# Patient Record
Sex: FEMALE | Race: BLACK OR AFRICAN AMERICAN
[De-identification: names, ages, dates, MRNs, and addresses within clinical notes are randomized per-mention and may not be internally consistent; named-entity substitution may affect disease eponyms.]

---

## 2018-06-01 ENCOUNTER — HOSPITAL ENCOUNTER (EMERGENCY)
Dept: HOSPITAL 68 - ERH | Age: 43
End: 2018-06-01
Payer: COMMERCIAL

## 2018-06-01 VITALS — DIASTOLIC BLOOD PRESSURE: 84 MMHG | SYSTOLIC BLOOD PRESSURE: 122 MMHG

## 2018-06-01 DIAGNOSIS — K62.89: Primary | ICD-10-CM

## 2018-06-01 NOTE — ED GI/GU/ABDOMINAL COMPLAINT
History of Present Illness
 
General
Chief Complaint: Abdominal Pain/Flank Pain
Stated Complaint: ABD PAIN, X 2 WEEKS
Source: patient
Exam Limitations: no limitations
 
Vital Signs & Intake/Output
Vital Signs & Intake/Output
 Vital Signs
 
 
Date Time Temp Pulse Resp B/P B/P Pulse O2 O2 Flow FiO2
 
     Mean Ox Delivery Rate 
 
 1345 98.0 99 18 122/84  99 Room Air  
 
 
 
Allergies
Coded Allergies:
No Known Allergies (17)
 
Reconcile Medications
Ibuprofen 800 MG TABLET   1 TAB PO TID PRN pain
 
Triage Nurses Notes Reviewed? yes
Pregnant? N
Is pt currently breastfeeding? No
Onset: Abrupt
Duration: day(s):, constant
Timing: recent history
HPI:
42-year-old female comes into the emergency room with complaints of mucousy 
discharge in rectal region as well as rectal pain.  The patient was seen in 
triage.  A full evaluation including physical exam and more detailed history 
cannot be performed due to the fact that she was seen in triage.  The patient 
came in with the expectation of a colonoscopy.  Patient reports that she had 
endometrial cancer back in January and had a hysterectomy.  She decided to opt 
out of radiation and chemotherapy.  She had read that coffee enemas could be 
helpful and started to do coffee enemas at home.  She reports that she was also 
juicing.  She was having good bowel movements but now she has been constipated 
and has not been able to have good bowel movements recently.  She has mucousy 
discharge.  Denies any fever chills vomiting.  She had prior  and the 
hysterectomy but denies any other abdominal surgeries.  She reports that she 
wants a colonoscopy done.
(Tariq Quiroz)
 
Past History
 
Travel History
Traveled to [place] past 21 day No
 
Medical History
Any Pertinent Medical History? see below for history
Neurological: NONE
EENT: NONE
Cardiovascular: NONE
Respiratory: NONE
Gastrointestinal: NONE
Hepatic: NONE
Renal: NONE
Musculoskeletal: NONE
Psychiatric: NONE
Endocrine: NONE
Blood Disorders: NONE
Cancer(s): ENDOMETRAIL CANCER
 
Surgical History
Surgical History: , hysterectomy
 
Psychosocial History
What is your primary language English
Tobacco Use: Never used
ETOH Use: denies use
Illicit Drug Use: denies illicit drug use
 
Family History
Hx Contributory? No
(Tariq Quiroz)
 
Review of Systems
 
Review of Systems
Constitutional:
Reports: no symptoms. 
EENTM:
Reports: no symptoms. 
Respiratory:
Reports: no symptoms. 
Cardiovascular:
Reports: no symptoms. 
GI:
Reports: see HPI. 
Genitourinary:
Reports: no symptoms. 
Musculoskeletal:
Reports: no symptoms. 
Skin:
Reports: no symptoms. 
Neurological/Psychological:
Reports: no symptoms. 
Hematologic/Endocrine:
Reports: no symptoms. 
Immunologic/Allergic:
Reports: no symptoms. 
All Other Systems: Reviewed and Negative
(Tariq Quiroz)
 
Physical Exam
 
Physical Exam
General Appearance: well developed/nourished, no apparent distress, alert
Head: atraumatic
Eyes:
Bilateral: normal appearance. 
Ears, Nose, Throat, Mouth: hearing grossly normal, moist mucous membrane
Neck: normal inspection
Respiratory: no respiratory distress
Gastrointestinal: Patient declined abdominal exam
Neurologic/Psych: awake, alert, oriented x 3
Skin: appears intact
 
Core Measures
ACS in differential dx? No
Sepsis Present: No
Sepsis Focused Exam Completed? No
(Tariq Quiroz)
 
Progress
Differential Diagnosis: appendicitis, biliary colic, bowel obstruction, colon 
cancer, diverticulitis, gastritis, ischemic bowel, intrauterine pregnancy, 
pancreatitis, perforated viscous, SBO, UTI/pyelo
Plan of Care:
2018 2:10:09 PM
 
I was the provider in triage today.  The patient was seen in triage.  History 
and exam was limited secondary to the fact that the patient was seen in triage. 
Patient decided to sign out AGAINST MEDICAL ADVICE that she wanted a 
colonoscopy.  Patient was told that she is signing out AGAINST MEDICAL ADVICE.  
She understands this.  I tried to convince the patient to stay for at least an 
abdominal exam and rectal exam and likely blood work and CT scan.  She declined 
aeverything.  She does not want to stay here in the hospital for further work 
up.  I explained to her that some of these things can be potentially life-
threatening.  She understands.  She does not want stay here.  Patient was under 
the expectation that she be able to get a colonoscopy.  I told her that we would
have to do an evaluation first which would require blood work rectal exam and 
diagnostic imaging.  I explained to her the likelihood is that a colonoscopy 
would not be done unless there was something emergent going on.  Cannot rule out
any type of emergent situation without getting a further evaluation done.  
Patient still wants to leave AGAINST MEDICAL ADVICE.  I was unable to perform 
any type of abdominal or rectal exam due to the fact that the patient just wants
to leave.  Physical exam is  based on inspection alone, she does not appear to 
be in any type of distress.  She clinically looks well.  She is nontoxic-
appearing.  she was recommended to follow-up with gastroenterologist and her 
primary care doctor.  She is mentally competent.  She is able to make her own 
medical decisions.
Initial ED EKG: none
(Tariq Quiroz)
 
Departure
 
Departure
Disposition: LEFT AGAINST MEDICAL ADVICE
Condition: Stable
Clinical Impression
Primary Impression: Rectal pain
Referrals:
Unknown (PCP/Family)
 
Additional Instructions:
You are leaving AGAINST MEDICAL ADVICE.  It has been recommended that she stay 
here for further evaluation for blood work and CAT scan rectal exam.  Cannot 
rule out any type of condition such as intestinal obstructions or diverticulitis
or other potentially by turning conditions without further evaluation.
 
Please go over all results of today's visit with your primary care doctor.  
Contact your primary care doctor to let them know you were here in the emergency
room.  There may be nonspecific findings which may not be related to your visit 
today here in the emergency room but may require further evaluation and chronic 
monitoring by your primary care doctor.  If you had a laceration today the 
chance of foreign body always remains. You should follow-up with your primary 
care doctor for recheck in 3-5 days for a wound check.  If you had an x-ray done
there is a chance that a fracture could have been missed on initial read and you
should follow-up with your primary care doctor for repeat x-rays if symptoms 
persist.  If your blood pressure was elevated here in the emergency room please 
have rechecked by [facility] primary care doctor within the next 48.  If you were 
prescribed a narcotic here in the emergency room or any type of controlled 
substances you're not allowed to drive while taking this medication or operate 
any type of heavy machinery.  Narcotics can make you feel lightheaded dizziness 
nausea and can cause constipation.  You may need to  a stool softener.  
Thank you for choosing [facility] emergency room.  Please return to the 
emergency room immediately if you have any other concerns worsening of symptoms.
 
Departure Forms:
Customer Survey
General Discharge Information
(Tariq Quiroz)
 
PA/NP Co-Sign Statement
Statement:
ED Attending supervision documentation-
 
 I saw and evaluated the patient. I have also reviewed all the pertinent lab 
results and diagnostic results. I agree with the findings and the plan of care 
as documented in the PA's/NP's documentation. 
 
x I have reviewed the ED Record and agree with the PA's/NP's documentation.
 
[] Additions or exceptions (if any) to the PAs/NP's note and plan are 
summarized below:
[]
 
(Darling KINNEY,Pratik)